# Patient Record
Sex: MALE | Race: WHITE | Employment: FULL TIME | URBAN - METROPOLITAN AREA
[De-identification: names, ages, dates, MRNs, and addresses within clinical notes are randomized per-mention and may not be internally consistent; named-entity substitution may affect disease eponyms.]

---

## 2017-03-17 ENCOUNTER — HOSPITAL ENCOUNTER (EMERGENCY)
Facility: CLINIC | Age: 47
Discharge: HOME | End: 2017-03-17
Attending: EMERGENCY MEDICINE | Admitting: EMERGENCY MEDICINE
Payer: COMMERCIAL

## 2017-03-17 ENCOUNTER — APPOINTMENT (OUTPATIENT)
Dept: CT IMAGING | Facility: CLINIC | Age: 47
End: 2017-03-17
Attending: EMERGENCY MEDICINE
Payer: COMMERCIAL

## 2017-03-17 VITALS
DIASTOLIC BLOOD PRESSURE: 89 MMHG | RESPIRATION RATE: 20 BRPM | WEIGHT: 220 LBS | SYSTOLIC BLOOD PRESSURE: 123 MMHG | OXYGEN SATURATION: 94 % | HEIGHT: 71 IN | BODY MASS INDEX: 30.8 KG/M2 | TEMPERATURE: 98.5 F

## 2017-03-17 DIAGNOSIS — R00.0 SINUS TACHYCARDIA: ICD-10-CM

## 2017-03-17 DIAGNOSIS — R10.11 ABDOMINAL PAIN, RIGHT UPPER QUADRANT: ICD-10-CM

## 2017-03-17 LAB
ALBUMIN UR-MCNC: 10 MG/DL
ANION GAP SERPL CALCULATED.3IONS-SCNC: 13 MMOL/L (ref 3–14)
APPEARANCE UR: CLEAR
BASOPHILS # BLD AUTO: 0 10E9/L (ref 0–0.2)
BASOPHILS NFR BLD AUTO: 0.2 %
BILIRUB UR QL STRIP: NEGATIVE
BUN SERPL-MCNC: 17 MG/DL (ref 7–30)
CALCIUM SERPL-MCNC: 8.9 MG/DL (ref 8.5–10.1)
CHLORIDE SERPL-SCNC: 103 MMOL/L (ref 94–109)
CO2 SERPL-SCNC: 23 MMOL/L (ref 20–32)
COLOR UR AUTO: YELLOW
CREAT SERPL-MCNC: 1.09 MG/DL (ref 0.66–1.25)
DIFFERENTIAL METHOD BLD: NORMAL
EOSINOPHIL # BLD AUTO: 0.1 10E9/L (ref 0–0.7)
EOSINOPHIL NFR BLD AUTO: 0.7 %
ERYTHROCYTE [DISTWIDTH] IN BLOOD BY AUTOMATED COUNT: 12.8 % (ref 10–15)
GFR SERPL CREATININE-BSD FRML MDRD: 73 ML/MIN/1.7M2
GLUCOSE SERPL-MCNC: 143 MG/DL (ref 70–99)
GLUCOSE UR STRIP-MCNC: NEGATIVE MG/DL
HCT VFR BLD AUTO: 45 % (ref 40–53)
HGB BLD-MCNC: 15.9 G/DL (ref 13.3–17.7)
HGB UR QL STRIP: NEGATIVE
HYALINE CASTS #/AREA URNS LPF: 1 /LPF (ref 0–2)
IMM GRANULOCYTES # BLD: 0 10E9/L (ref 0–0.4)
IMM GRANULOCYTES NFR BLD: 0.2 %
KETONES UR STRIP-MCNC: NEGATIVE MG/DL
LEUKOCYTE ESTERASE UR QL STRIP: NEGATIVE
LYMPHOCYTES # BLD AUTO: 2.7 10E9/L (ref 0.8–5.3)
LYMPHOCYTES NFR BLD AUTO: 26.3 %
MCH RBC QN AUTO: 31.2 PG (ref 26.5–33)
MCHC RBC AUTO-ENTMCNC: 35.3 G/DL (ref 31.5–36.5)
MCV RBC AUTO: 88 FL (ref 78–100)
MONOCYTES # BLD AUTO: 0.5 10E9/L (ref 0–1.3)
MONOCYTES NFR BLD AUTO: 5.1 %
MUCOUS THREADS #/AREA URNS LPF: PRESENT /LPF
NEUTROPHILS # BLD AUTO: 7 10E9/L (ref 1.6–8.3)
NEUTROPHILS NFR BLD AUTO: 67.5 %
NITRATE UR QL: NEGATIVE
NRBC # BLD AUTO: 0 10*3/UL
NRBC BLD AUTO-RTO: 0 /100
PH UR STRIP: 6 PH (ref 5–7)
PLATELET # BLD AUTO: 230 10E9/L (ref 150–450)
POTASSIUM SERPL-SCNC: 4 MMOL/L (ref 3.4–5.3)
RBC # BLD AUTO: 5.1 10E12/L (ref 4.4–5.9)
RBC #/AREA URNS AUTO: 1 /HPF (ref 0–2)
SODIUM SERPL-SCNC: 139 MMOL/L (ref 133–144)
SP GR UR STRIP: 1.01 (ref 1–1.03)
URN SPEC COLLECT METH UR: ABNORMAL
UROBILINOGEN UR STRIP-MCNC: NORMAL MG/DL (ref 0–2)
WBC # BLD AUTO: 10.3 10E9/L (ref 4–11)
WBC #/AREA URNS AUTO: 1 /HPF (ref 0–2)

## 2017-03-17 PROCEDURE — 71260 CT THORAX DX C+: CPT

## 2017-03-17 PROCEDURE — 99285 EMERGENCY DEPT VISIT HI MDM: CPT | Mod: 25

## 2017-03-17 PROCEDURE — 80048 BASIC METABOLIC PNL TOTAL CA: CPT | Performed by: EMERGENCY MEDICINE

## 2017-03-17 PROCEDURE — 25000125 ZZHC RX 250: Performed by: EMERGENCY MEDICINE

## 2017-03-17 PROCEDURE — 25500064 ZZH RX 255 OP 636: Performed by: EMERGENCY MEDICINE

## 2017-03-17 PROCEDURE — 81001 URINALYSIS AUTO W/SCOPE: CPT | Performed by: EMERGENCY MEDICINE

## 2017-03-17 PROCEDURE — 74177 CT ABD & PELVIS W/CONTRAST: CPT

## 2017-03-17 PROCEDURE — 85025 COMPLETE CBC W/AUTO DIFF WBC: CPT | Performed by: EMERGENCY MEDICINE

## 2017-03-17 RX ORDER — SODIUM CHLORIDE 9 MG/ML
INJECTION, SOLUTION INTRAVENOUS ONCE
Status: DISCONTINUED | OUTPATIENT
Start: 2017-03-17 | End: 2017-03-17 | Stop reason: HOSPADM

## 2017-03-17 RX ORDER — IOPAMIDOL 755 MG/ML
83 INJECTION, SOLUTION INTRAVASCULAR ONCE
Status: COMPLETED | OUTPATIENT
Start: 2017-03-17 | End: 2017-03-17

## 2017-03-17 RX ADMIN — IOPAMIDOL 83 ML: 755 INJECTION, SOLUTION INTRAVENOUS at 16:34

## 2017-03-17 RX ADMIN — SODIUM CHLORIDE 101 ML: 9 INJECTION, SOLUTION INTRAVENOUS at 16:35

## 2017-03-17 ASSESSMENT — ENCOUNTER SYMPTOMS
CONSTIPATION: 0
BLOOD IN STOOL: 0
SHORTNESS OF BREATH: 0
DIARRHEA: 0
FLANK PAIN: 1
VOMITING: 0
NAUSEA: 0
BACK PAIN: 0
FEVER: 0
DIFFICULTY URINATING: 0
DYSURIA: 0
ABDOMINAL PAIN: 1
CHILLS: 0
ACTIVITY CHANGE: 0
COUGH: 0
HEMATURIA: 0

## 2017-03-17 NOTE — ED PROVIDER NOTES
"  History     Chief Complaint:  Abdominal Discomfort     HPI   Harjeet Everett is a 46 year old male s/p cholecystectomy who presents with right sided abdominal discomfort. The patient reports that he first developed discomfort in his right side 3 weeks ago. He states the discomfort has persisted and not gotten any better, prompting him to come to the ED for evaluation. On arrival to the ED, the patient reports that he has a discomfort in his right flank and right lower abdomen that he describes as a \"strained muscle\" and a \"feeling of inflammation inside him\". He states that it is a mild, dull pressure sensation. He states that he worked out this morning and had a mild increase in the discomfort after about 20 minutes on the treadmill. The pain does not radiate anywhere. He has not tried any medications, ice, or heat for the pain. The patient denies any fever, diarrhea, constipation, nausea, vomiting, hematuria, dysuria, or other urinary symptoms. He denies any back pain or pain that radiates into his testicles or scrotum.  No chest pain, shortness of breath, or cough. No history of kidney stone.     PE/DVT RISK FACTORS:  Sex:    Male  Hormones:   No  Tobacco:   No  Cancer:   No  Travel:   Yes, [place] in the last couple months  Surgery:   No  Other immobilization: No  Personal history:  No  Family history:  No    Allergies:  No Known Allergies     Medications:    METOPROLOL TARTRATE PO  SIMVASTATIN PO  ASPIRIN PO    Past Medical History:    History reviewed. No pertinent past medical history.    Past Surgical History:    Fixed aortic valve  Cholecystectomy     Family History:    History reviewed. No pertinent family history.    Social History:  Smoking status: No  Alcohol use: Yes  Patient works as computer programer  Marital Status:   [2]     Review of Systems   Constitutional: Negative for activity change, chills and fever.   HENT: Negative for congestion.    Respiratory: Negative for cough and " "shortness of breath.    Cardiovascular: Negative for chest pain.   Gastrointestinal: Positive for abdominal pain. Negative for blood in stool, constipation, diarrhea, nausea and vomiting.   Genitourinary: Positive for flank pain. Negative for difficulty urinating, dysuria, hematuria, scrotal swelling and testicular pain.   Musculoskeletal: Negative for back pain.   All other systems reviewed and are negative.      Physical Exam     Patient Vitals for the past 24 hrs:   BP Temp Heart Rate Resp SpO2 Height Weight   03/17/17 1717 - - - - 94 % - -   03/17/17 1715 123/89 - - - - - -   03/17/17 1615 - - 105 20 91 % - -   03/17/17 1600 - - 104 21 93 % - -   03/17/17 1530 - - 107 16 92 % - -   03/17/17 1324 (!) 129/93 98.5  F (36.9  C) 109 18 92 % 1.803 m (5' 11\") 99.8 kg (220 lb)          Physical Exam  Nursing note and vitals reviewed.  Constitutional:  Appears well-developed and well-nourished, comfortable.   HENT:   Head:   No evidence of facial or scalp trauma.  Nose:    Nose normal.   Mouth/Throat:  Mucosa is moist.  Eyes:    Conjunctivae are normal.      Pupils are equal, round, and reactive to light.      Right eye exhibits no discharge. Left eye exhibits no discharge.      No scleral icterus.   Cardiovascular:  Tachycardic.  regular rhythm.      Normal heart sounds and intact distal pulses.       No murmur heard.  Pulmonary/Chest:  Effort normal and breath sounds normal. No respiratory distress.     No wheezes. No rales. No chest wall tenderness. No stridor.   Abdominal:   Soft. No distension and no mass. Slight discomfort over the right lateral abdomen.      No rebound and no guarding. No flank pain.  Musculoskeletal:  Normal range of motion.      No edema and no tenderness.                                       Neck supple, no midline cervical tenderness.   Neurological:   Alert and oriented to person, place, and year.      No cranial nerve deficit.      Exhibits normal muscle tone. Coordination normal.      GCS " eye subscore is 4. GCS verbal subscore is 5.      GCS motor subscore is 6.   Skin:    Skin is warm and dry. No rash noted. No diaphoresis.      No erythema. No pallor.   Psychiatric:   Behavior is normal. Judgment and thought content normal.     Emergency Department Course   Imaging:  Radiographic findings were communicated with the patient who voiced understanding of the findings.    CT-scan Chest/Abdomen/Pelvis w/ contrast:  No acute process demonstrated within the chest, abdomen  and pelvis.   Preliminary result per radiology.     Laboratory:  CBC: WNL (WBC 10.3, HGB 15.9, )   BMP: Glucose 143(H), o/w WNL (Creatinine 1.09)  UA: Protein albumin 10, Mucous present, o/w negative    Emergency Department Course:  Past medical records, nursing notes, and vitals reviewed.  1322: I performed an exam of the patient and obtained history, as documented above.  IV inserted and blood drawn.  The patient was sent for a CT chest abdomen pelvis while in the emergency department, findings above.    1708: I rechecked the patient. Explained findings to the patient.    I rechecked the patient.  Findings and plan explained to the Patient. Patient discharged home with instructions regarding supportive care, medications, and reasons to return. The importance of close follow-up was reviewed.     Impression & Plan      Medical Decision Making:  Patient comes in with right sided abdominal sensation. He doesn't really even describe it as a pain. He can feel me pushing on the lateral abdomen which reproduces some of the symptoms. However, his lab work came back normal including his CBC and a basic panel. His glucose is 143, it is slightly elevated. Urine is normal without blood. I noted that his pulse rate ran in the 105-110 range at rest. He doesn't remember being told that he had that issue. Saturations are also 92-94% on room air. He apparently uses an inhaler at home without a spacer tube and has been told by his doctor that he  has some pulmonary disease. He has never seen a pulmonologist for this. Because of the fact of the tachycardia and lower sats and his discomfort and recent travel I did get a PE study. CT of the chest was obtained and did not show any abnormality. No PE. No infiltrate. I extended it down into the abdomen because of this fullness feeling in his abdomen to make sure he didn't have an underlying tumor or other abnormality. It was completley normal. No evidence of renal infarct or other abnormality. I am comfortable sending him home at this point. He needs follow up as scheduled on Tuesday with his doctor and would recommend him seeing a pulmonologist regarding his lower sats and his breathing difficulties. He said he gets an ehco on a regular basis because of his history of valve repair and said that they have been normal. He has had no chest pain. He will return if he gets worse in any way such as fevers, shortness of breath, etc. I really don't know what is causing this sensation in his abdomen. It could be a pulled muscle but were are going to observe at this time and if it progressively worsens he may need further workup.      Diagnosis:    ICD-10-CM    1. Abdominal pain, right upper quadrant R10.11     Lateral mild discomfort   2. Sinus tachycardia R00.0      Disposition: Home, use the spacer with your inhaler. Recheck in the clinic as scheduled next week and consider getting a consultation with a pulmonologist. If you get worse with fevers, shortness of breath or other, return to the ER.    Vale Huynh  3/17/2017    EMERGENCY DEPARTMENT    I, Vale Huynh, am serving as a scribe at 1:22 PM on 3/17/2017 to document services personally performed by Aileen Mak MD based on my observations and the provider's statements to me.        Aileen Mak MD  03/18/17 0942

## 2017-03-17 NOTE — ED AVS SNAPSHOT
Emergency Department    [address] [place] [id number]    Phone:  [phone]    Fax:  [phone]                                       Harjeet Everett   MRN: [id number]    Department:   Emergency Department   Date of Visit:  3/17/2017           Patient Information     Date Of Birth          1970        Your diagnoses for this visit were:     Abdominal pain, right upper quadrant Lateral mild discomfort    Sinus tachycardia        You were seen by Aileen Mak MD.      Follow-up Information     Follow up with Magdy Flowers MD.    Specialty:  Family Practice    Contact information:    [facility]  [address] [place] [address] [phone]          Discharge Instructions       Home, use the spacer with your inhaler.  Recheck in the clinic as scheduled next week and consider getting a consultation with a pulmonologist.  If you get worse with fevers, shortness of breath or other, return to the ER.    Discharge Instructions  Abdominal Pain    Abdominal pain can be caused by many things. Your evaluation today does not show the exact cause for your pain. Your doctor today has decided that it is unlikely your pain is due to a life threatening problem, or a problem requiring surgery or hospital admission. Sometimes those problems cannot be found right away, so it is very important that you follow up as directed.  Sometimes only the changes which occur over time allow the cause of your pain to be found.    Return to the Emergency Department for a recheck in 8-12 hours if your pain continues.  If your pain gets worse, changes in location, or feels different, return to the Emergency Department right away.    ADULTS:  Return to the Emergency Department right away if:      You get an oral temperature above 102oF or as directed by your doctor.    You have blood in your stools (bright red or black, tarry stools).    You keep throwing up or can t drink  liquids.    You see blood when you throw up.    You can t have a bowel movement or you can t pass gas.    Your stomach gets bloated or bigger.    Your skin or the whites of your eyes look yellow.    You faint.    You have bloody, frequent or painful urination.    You have new symptoms or anything that worries you.    CHILDREN:  Return to the Emergency Department right away if your child has any of the above-listed symptoms or the following:      Pushes your hand away or screams/cries when his/her belly is touched.    You notice your child is very fussy or weak.    Your child is very tired and is too tired to eat or drink.    Your child is dehydrated.  Signs of dehydration can be:  o Your infant has had no wet diapers in 4-5 hours.  o Your older child has not passed urine in 6-8 hours.  o Your infant or child starts to have dry mouth and lips, or no saliva or tears.    PREGNANT WOMEN:  Return to the Emergency Department right away if you have any of the above-listed symptoms or the following:      You have bleeding, leaking fluid or passing tissue from the vagina.    You have worse pain or cramping, or pain in your shoulder or back.    You have vomiting that will not stop.    You have painful or bloody urination.    You have a temperature of 100oF or more.    Your baby is not moving as much as usual.    You faint.    You get a bad headache with or without eye problems and abdominal pain.    You have a convulsion or seizure.    You have unusual discharge from your vagina and abdominal pain.    Abdominal pain is pretty common during pregnancy.  Your pain may or may not be related to your pregnancy. You should follow-up closely with your OB doctor so they can evaluate you and your baby.  Until you follow-up with your regular doctor, do the following:       Avoid sex and do not put anything in your vagina.    Drink clear fluids.    Only take medications approved by your doctor.    MORE INFORMATION:    Appendicitis:  A  "possible cause of abdominal pain in any person who still has their appendix is acute appendicitis. Appendicitis is often hard to diagnose.  Testing does not always rule out early appendicitis or other causes of abdominal pain. Close follow-up with your doctor and re-evaluations may be needed to figure out the reason for your abdominal pain.    Follow-up:  It is very important that you make an appointment with your clinic and go to the appointment.  If you do not follow-up with your primary doctor, it may result in missing an important development which could result in permanent injury or disability and/or lasting pain.  If there is any problem keeping your appointment, call your doctor or return to the Emergency Department.    Medications:  Take your medications as directed by your doctor today.  Before using over-the-counter medications, ask your doctor and make sure to take the medications as directed.  If you have any questions about medications, ask your doctor.    Diet:  Resume your normal diet as much as possible, but do not eat fried, fatty or spicy foods while you have pain.  Do not drink alcohol or have caffeine.  Do not smoke tobacco.    Probiotics: If you have been given an antibiotic, you may want to also take a probiotic pill or eat yogurt with live cultures. Probiotics have \"good bacteria\" to help your intestines stay healthy. Studies have shown that probiotics help prevent diarrhea and other intestine problems (including C. diff infection) when you take antibiotics. You can buy these without a prescription in the pharmacy section of the store.     If you were given a prescription for medicine here today, be sure to read all of the information (including the package insert) that comes with your prescription.  This will include important information about the medicine, its side effects, and any warnings that you need to know about.  The pharmacist who fills the prescription can provide more information " and answer questions you may have about the medicine.  If you have questions or concerns that the pharmacist cannot address, please call or return to the Emergency Department.         Opioid Medication Information    Pain medications are among the most commonly prescribed medicines, so we are including this information for all our patients. If you did not receive pain medication or get a prescription for pain medicine, you can ignore it.     You may have been given a prescription for an opioid (narcotic) pain medicine and/or have received a pain medicine while here in the Emergency Department. These medicines can make you drowsy or impaired. You must not drive, operate dangerous equipment, or engage in any other dangerous activities while taking these medications. If you drive while taking these medications, you could be arrested for DUI, or driving under the influence. Do not drink any alcohol while you are taking these medications.     Opioid pain medications can cause addiction. If you have a history of chemical dependency of any type, you are at a higher risk of becoming addicted to pain medications.  Only take these prescribed medications to treat your pain when all other options have been tried. Take it for as short a time and as few doses as possible. Store your pain pills in a secure place, as they are frequently stolen and provide a dangerous opportunity for children or visitors in your house to start abusing these powerful medications. We will not replace any lost or stolen medicine.  As soon as your pain is better, you should flush all your remaining medication.     Many prescription pain medications contain Tylenol  (acetaminophen), including Vicodin , Tylenol #3 , Norco , Lortab , and Percocet .  You should not take any extra pills of Tylenol  if you are using these prescription medications or you can get very sick.  Do not ever take more than 3000 mg of acetaminophen in any 24 hour period.    All  opioids tend to cause constipation. Drink plenty of water and eat foods that have a lot of fiber, such as fruits, vegetables, prune juice, apple juice and high fiber cereal.  Take a laxative if you don t move your bowels at least every other day. Miralax , Milk of Magnesia, Colace , or Senna  can be used to keep you regular.      Remember that you can always come back to the Emergency Department if you are not able to see your regular doctor in the amount of time listed above, if you get any new symptoms, or if there is anything that worries you.          24 Hour Appointment Hotline       To make an appointment at any [facility], call [phone] ([phone]). If you don't have a family doctor or clinic, we will help you find one. [facility] are conveniently located to serve the needs of you and your family.             Review of your medicines      Our records show that you are taking the medicines listed below. If these are incorrect, please call your family doctor or clinic.        Dose / Directions Last dose taken    ASPIRIN PO        Refills:  0        LOSARTAN POTASSIUM PO   Dose:  50 mg        Take 50 mg by mouth   Refills:  0        METHOTREXATE PO   Dose:  2.5 mg        Take 2.5 mg by mouth   Refills:  0        SIMVASTATIN PO        Refills:  0                Procedures and tests performed during your visit     Basic metabolic panel    CBC with platelets + differential    CT Chest/Abdomen/Pelvis w Contrast    UA with Microscopic      Orders Needing Specimen Collection     None      Pending Results     Date and Time Order Name Status Description    3/17/2017 1520 CT Chest/Abdomen/Pelvis w Contrast Preliminary             Pending Culture Results     No orders found from 3/15/2017 to 3/18/2017.             Test Results from your hospital stay     3/17/2017  3:00 PM - Interface, PerfectSearch Results      Component Results     Component Value Ref Range & Units Status    Sodium 139 133 - 144 mmol/L  Final    Potassium 4.0 3.4 - 5.3 mmol/L Final    Chloride 103 94 - 109 mmol/L Final    Carbon Dioxide 23 20 - 32 mmol/L Final    Anion Gap 13 3 - 14 mmol/L Final    Glucose 143 (H) 70 - 99 mg/dL Final    Urea Nitrogen 17 7 - 30 mg/dL Final    Creatinine 1.09 0.66 - 1.25 mg/dL Final    GFR Estimate 73 >60 mL/min/1.7m2 Final    Non  GFR Calc    GFR Estimate If Black 88 >60 mL/min/1.7m2 Final    African American GFR Calc    Calcium 8.9 8.5 - 10.1 mg/dL Final         3/17/2017  2:17 PM - Interface, Flexilab Results      Component Results     Component Value Ref Range & Units Status    WBC 10.3 4.0 - 11.0 10e9/L Final    RBC Count 5.10 4.4 - 5.9 10e12/L Final    Hemoglobin 15.9 13.3 - 17.7 g/dL Final    Hematocrit 45.0 40.0 - 53.0 % Final    MCV 88 78 - 100 fl Final    MCH 31.2 26.5 - 33.0 pg Final    MCHC 35.3 31.5 - 36.5 g/dL Final    RDW 12.8 10.0 - 15.0 % Final    Platelet Count 230 150 - 450 10e9/L Final    Diff Method Automated Method  Final    % Neutrophils 67.5 % Final    % Lymphocytes 26.3 % Final    % Monocytes 5.1 % Final    % Eosinophils 0.7 % Final    % Basophils 0.2 % Final    % Immature Granulocytes 0.2 % Final    Nucleated RBCs 0 0 /100 Final    Absolute Neutrophil 7.0 1.6 - 8.3 10e9/L Final    Absolute Lymphocytes 2.7 0.8 - 5.3 10e9/L Final    Absolute Monocytes 0.5 0.0 - 1.3 10e9/L Final    Absolute Eosinophils 0.1 0.0 - 0.7 10e9/L Final    Absolute Basophils 0.0 0.0 - 0.2 10e9/L Final    Abs Immature Granulocytes 0.0 0 - 0.4 10e9/L Final    Absolute Nucleated RBC 0.0  Final         3/17/2017  2:17 PM - Interface, Flexilab Results      Component Results     Component Value Ref Range & Units Status    Color Urine Yellow  Final    Appearance Urine Clear  Final    Glucose Urine Negative NEG mg/dL Final    Bilirubin Urine Negative NEG Final    Ketones Urine Negative NEG mg/dL Final    Specific Gravity Urine 1.015 1.003 - 1.035 Final    Blood Urine Negative NEG Final    pH Urine 6.0 5.0 - 7.0  pH Final    Protein Albumin Urine 10 (A) NEG mg/dL Final    Urobilinogen mg/dL Normal 0.0 - 2.0 mg/dL Final    Nitrite Urine Negative NEG Final    Leukocyte Esterase Urine Negative NEG Final    Source Midstream Urine  Final    WBC Urine 1 0 - 2 /HPF Final    RBC Urine 1 0 - 2 /HPF Final    Mucous Urine Present (A) NEG /LPF Final    Hyaline Casts 1 0 - 2 /LPF Final         3/17/2017  4:52 PM - Interface, Radiant Ib      Narrative     CT CHEST, ABDOMEN, PELVIS WITH CONTRAST 3/17/2017 4:39 PM    HISTORY: Chest pain.    COMPARISON: 8/14/2011    TECHNIQUE: Volumetric helical acquisition of CT images from the lung  apices through the symphysis pubis after the uneventful administration  of 83 mL Isovue-370 intravenous contrast. Radiation dose for this scan  was reduced using automated exposure control, adjustment of the mA  and/or kV according to patient size, or iterative reconstruction  technique.    FINDINGS:     Chest: There is no pulmonary embolism. Lungs are clear of acute  infiltrates. The heart is not enlarged. Thoracic aorta is unremarkable  in appearance without calcified atherosclerosis and without evidence  of dissection or aneurysm. There is no pleural or pericardial  effusion. There is no pneumothorax.     Abdomen and pelvis: The liver, bilateral kidneys and adrenal glands,  pancreas, and spleen demonstrate no worrisome focal lesion. Marked  fatty infiltration of the liver. Normal appendix. No hydronephrosis.  No atherosclerotic changes are seen in the aorta and its branches. The  aorta is normal in caliber without dissection. There is no free fluid  in the abdomen or pelvis. No free air in the abdomen. Bone windows  reveal no destructive lesions. There are no abdominal or pelvic lymph  nodes that are abnormal by size criteria. There are no dilated loops  of small bowel or colon.        Impression     IMPRESSION: No acute process demonstrated within the chest, abdomen  and pelvis.                 Clinical  Quality Measure: Blood Pressure Screening     Your blood pressure was checked while you were in the emergency department today. The last reading we obtained was  BP: 123/89 . Please read the guidelines below about what these numbers mean and what you should do about them.  If your systolic blood pressure (the top number) is less than 120 and your diastolic blood pressure (the bottom number) is less than 80, then your blood pressure is normal. There is nothing more that you need to do about it.  If your systolic blood pressure (the top number) is 120-139 or your diastolic blood pressure (the bottom number) is 80-89, your blood pressure may be higher than it should be. You should have your blood pressure rechecked within a year by a primary care provider.  If your systolic blood pressure (the top number) is 140 or greater or your diastolic blood pressure (the bottom number) is 90 or greater, you may have high blood pressure. High blood pressure is treatable, but if left untreated over time it can put you at risk for heart attack, stroke, or kidney failure. You should have your blood pressure rechecked by a primary care provider within the next 4 weeks.  If your provider in the emergency department today gave you specific instructions to follow-up with your doctor or provider even sooner than that, you should follow that instruction and not wait for up to 4 weeks for your follow-up visit.        Thank you for choosing [facility]       Thank you for choosing [facility] for your care. Our goal is always to provide you with excellent care. Hearing back from our patients is one way we can continue to improve our services. Please take a few minutes to complete the written survey that you may receive in the mail after you visit with us. Thank you!        [facility] Information     [employer] lets you send messages to your doctor, view your test results, renew your prescriptions, schedule appointments and more. To sign up, go to  [email or web address] . Click on \"Log in\" on the left side of the screen, which will take you to the Welcome page. Then click on \"Sign up Now\" on the right side of the page.     You will be asked to enter the access code listed below, as well as some personal information. Please follow the directions to create your username and password.     Your access code is: [email or web address]  Expires: 6/15/2017  1:45 PM     Your access code will  in 90 days. If you need help or a new code, please call your [place] [facility] clinic or [phone].        Care EveryWhere ID     This is your Care EveryWhere ID. This could be used by other organizations to access your [facility] medical records  [id number]        After Visit Summary       This is your record. Keep this with you and show to your community pharmacist(s) and doctor(s) at your next visit.                  "

## 2017-03-17 NOTE — DISCHARGE INSTRUCTIONS
Home, use the spacer with your inhaler.  Recheck in the clinic as scheduled next week and consider getting a consultation with a pulmonologist.  If you get worse with fevers, shortness of breath or other, return to the ER.    Discharge Instructions  Abdominal Pain    Abdominal pain can be caused by many things. Your evaluation today does not show the exact cause for your pain. Your doctor today has decided that it is unlikely your pain is due to a life threatening problem, or a problem requiring surgery or hospital admission. Sometimes those problems cannot be found right away, so it is very important that you follow up as directed.  Sometimes only the changes which occur over time allow the cause of your pain to be found.    Return to the Emergency Department for a recheck in 8-12 hours if your pain continues.  If your pain gets worse, changes in location, or feels different, return to the Emergency Department right away.    ADULTS:  Return to the Emergency Department right away if:      You get an oral temperature above 102oF or as directed by your doctor.    You have blood in your stools (bright red or black, tarry stools).    You keep throwing up or can t drink liquids.    You see blood when you throw up.    You can t have a bowel movement or you can t pass gas.    Your stomach gets bloated or bigger.    Your skin or the whites of your eyes look yellow.    You faint.    You have bloody, frequent or painful urination.    You have new symptoms or anything that worries you.    CHILDREN:  Return to the Emergency Department right away if your child has any of the above-listed symptoms or the following:      Pushes your hand away or screams/cries when his/her belly is touched.    You notice your child is very fussy or weak.    Your child is very tired and is too tired to eat or drink.    Your child is dehydrated.  Signs of dehydration can be:  o Your infant has had no wet diapers in 4-5 hours.  o Your older child has  not passed urine in 6-8 hours.  o Your infant or child starts to have dry mouth and lips, or no saliva or tears.    PREGNANT WOMEN:  Return to the Emergency Department right away if you have any of the above-listed symptoms or the following:      You have bleeding, leaking fluid or passing tissue from the vagina.    You have worse pain or cramping, or pain in your shoulder or back.    You have vomiting that will not stop.    You have painful or bloody urination.    You have a temperature of 100oF or more.    Your baby is not moving as much as usual.    You faint.    You get a bad headache with or without eye problems and abdominal pain.    You have a convulsion or seizure.    You have unusual discharge from your vagina and abdominal pain.    Abdominal pain is pretty common during pregnancy.  Your pain may or may not be related to your pregnancy. You should follow-up closely with your OB doctor so they can evaluate you and your baby.  Until you follow-up with your regular doctor, do the following:       Avoid sex and do not put anything in your vagina.    Drink clear fluids.    Only take medications approved by your doctor.    MORE INFORMATION:    Appendicitis:  A possible cause of abdominal pain in any person who still has their appendix is acute appendicitis. Appendicitis is often hard to diagnose.  Testing does not always rule out early appendicitis or other causes of abdominal pain. Close follow-up with your doctor and re-evaluations may be needed to figure out the reason for your abdominal pain.    Follow-up:  It is very important that you make an appointment with your clinic and go to the appointment.  If you do not follow-up with your primary doctor, it may result in missing an important development which could result in permanent injury or disability and/or lasting pain.  If there is any problem keeping your appointment, call your doctor or return to the Emergency Department.    Medications:  Take your  "medications as directed by your doctor today.  Before using over-the-counter medications, ask your doctor and make sure to take the medications as directed.  If you have any questions about medications, ask your doctor.    Diet:  Resume your normal diet as much as possible, but do not eat fried, fatty or spicy foods while you have pain.  Do not drink alcohol or have caffeine.  Do not smoke tobacco.    Probiotics: If you have been given an antibiotic, you may want to also take a probiotic pill or eat yogurt with live cultures. Probiotics have \"good bacteria\" to help your intestines stay healthy. Studies have shown that probiotics help prevent diarrhea and other intestine problems (including C. diff infection) when you take antibiotics. You can buy these without a prescription in the pharmacy section of the store.     If you were given a prescription for medicine here today, be sure to read all of the information (including the package insert) that comes with your prescription.  This will include important information about the medicine, its side effects, and any warnings that you need to know about.  The pharmacist who fills the prescription can provide more information and answer questions you may have about the medicine.  If you have questions or concerns that the pharmacist cannot address, please call or return to the Emergency Department.         Opioid Medication Information    Pain medications are among the most commonly prescribed medicines, so we are including this information for all our patients. If you did not receive pain medication or get a prescription for pain medicine, you can ignore it.     You may have been given a prescription for an opioid (narcotic) pain medicine and/or have received a pain medicine while here in the Emergency Department. These medicines can make you drowsy or impaired. You must not drive, operate dangerous equipment, or engage in any other dangerous activities while taking these " medications. If you drive while taking these medications, you could be arrested for DUI, or driving under the influence. Do not drink any alcohol while you are taking these medications.     Opioid pain medications can cause addiction. If you have a history of chemical dependency of any type, you are at a higher risk of becoming addicted to pain medications.  Only take these prescribed medications to treat your pain when all other options have been tried. Take it for as short a time and as few doses as possible. Store your pain pills in a secure place, as they are frequently stolen and provide a dangerous opportunity for children or visitors in your house to start abusing these powerful medications. We will not replace any lost or stolen medicine.  As soon as your pain is better, you should flush all your remaining medication.     Many prescription pain medications contain Tylenol  (acetaminophen), including Vicodin , Tylenol #3 , Norco , Lortab , and Percocet .  You should not take any extra pills of Tylenol  if you are using these prescription medications or you can get very sick.  Do not ever take more than 3000 mg of acetaminophen in any 24 hour period.    All opioids tend to cause constipation. Drink plenty of water and eat foods that have a lot of fiber, such as fruits, vegetables, prune juice, apple juice and high fiber cereal.  Take a laxative if you don t move your bowels at least every other day. Miralax , Milk of Magnesia, Colace , or Senna  can be used to keep you regular.      Remember that you can always come back to the Emergency Department if you are not able to see your regular doctor in the amount of time listed above, if you get any new symptoms, or if there is anything that worries you.
